# Patient Record
Sex: FEMALE | Race: WHITE | NOT HISPANIC OR LATINO | URBAN - METROPOLITAN AREA
[De-identification: names, ages, dates, MRNs, and addresses within clinical notes are randomized per-mention and may not be internally consistent; named-entity substitution may affect disease eponyms.]

---

## 2023-08-29 PROBLEM — K13.0 ANGULAR CHEILITIS: Status: ACTIVE | Noted: 2023-08-29

## 2023-08-29 PROBLEM — M41.9 SCOLIOSIS: Status: ACTIVE | Noted: 2023-08-29

## 2023-08-29 RX ORDER — ALUMINUM CHLORIDE 20 %
1 SOLUTION, NON-ORAL TOPICAL NIGHTLY
COMMUNITY
End: 2024-02-02 | Stop reason: SDUPTHER

## 2023-08-29 RX ORDER — NORETHINDRONE ACETATE AND ETHINYL ESTRADIOL .02; 1 MG/1; MG/1
1 TABLET ORAL DAILY
COMMUNITY
End: 2024-02-02 | Stop reason: WASHOUT

## 2023-08-29 RX ORDER — LEVONORGESTREL/ETHIN.ESTRADIOL 0.1-0.02MG
1 TABLET ORAL DAILY
COMMUNITY
Start: 2019-02-13 | End: 2024-02-02 | Stop reason: WASHOUT

## 2023-11-10 ENCOUNTER — CLINICAL SUPPORT (OUTPATIENT)
Dept: OPHTHALMOLOGY | Facility: CLINIC | Age: 28
End: 2023-11-10
Payer: COMMERCIAL

## 2023-11-10 ENCOUNTER — APPOINTMENT (OUTPATIENT)
Dept: OPHTHALMOLOGY | Facility: CLINIC | Age: 28
End: 2023-11-10
Payer: COMMERCIAL

## 2023-11-10 ENCOUNTER — OFFICE VISIT (OUTPATIENT)
Dept: OPHTHALMOLOGY | Facility: CLINIC | Age: 28
End: 2023-11-10
Payer: COMMERCIAL

## 2023-11-10 DIAGNOSIS — H01.02B SQUAMOUS BLEPHARITIS OF UPPER AND LOWER EYELIDS OF BOTH EYES: ICD-10-CM

## 2023-11-10 DIAGNOSIS — H52.7 REFRACTIVE ERROR: Primary | ICD-10-CM

## 2023-11-10 DIAGNOSIS — H18.823 CONTACT LENS OVERWEAR OF BOTH EYES: ICD-10-CM

## 2023-11-10 DIAGNOSIS — H01.02A SQUAMOUS BLEPHARITIS OF UPPER AND LOWER EYELIDS OF BOTH EYES: ICD-10-CM

## 2023-11-10 PROCEDURE — 92015 DETERMINE REFRACTIVE STATE: CPT | Performed by: OPHTHALMOLOGY

## 2023-11-10 PROCEDURE — FLVLF CONTACT LENS EVALUATION (SP): Performed by: OPHTHALMOLOGY

## 2023-11-10 PROCEDURE — 99214 OFFICE O/P EST MOD 30 MIN: CPT | Performed by: OPHTHALMOLOGY

## 2023-11-10 ASSESSMENT — SLIT LAMP EXAM - LIDS
COMMENTS: NORMAL
COMMENTS: NORMAL

## 2023-11-10 ASSESSMENT — REFRACTION_CURRENTRX
OS_SPHERE: +5.50
OD_SPHERE: +5.25
OS_BRAND: AIR OPTIX HYDRAGLYDE
OD_DIAMETER: 14.2
OD_SPHERE: +5.25
OD_BASECURVE: 8.6
OS_DIAMETER: 14.2
OS_DIAMETER: 14.2
OD_BASECURVE: 8.6
OS_BRAND: AIR OPTIX HYDRAGLYDE
OD_DIAMETER: 14.2
OS_BASECURVE: 8.6
OS_SPHERE: +5.75
OD_BRAND: AIR OPTIX HYDRAGLYDE
OD_BRAND: AIR OPTIX HYDRAGLYDE
OS_BASECURVE: 8.6

## 2023-11-10 ASSESSMENT — ENCOUNTER SYMPTOMS
NEUROLOGICAL NEGATIVE: 0
DEPRESSION: 0
HEMATOLOGIC/LYMPHATIC NEGATIVE: 0
OCCASIONAL FEELINGS OF UNSTEADINESS: 0
ENDOCRINE NEGATIVE: 0
LOSS OF SENSATION IN FEET: 0
CARDIOVASCULAR NEGATIVE: 0
EYES NEGATIVE: 0
MUSCULOSKELETAL NEGATIVE: 0
RESPIRATORY NEGATIVE: 0
ALLERGIC/IMMUNOLOGIC NEGATIVE: 0
PSYCHIATRIC NEGATIVE: 0
GASTROINTESTINAL NEGATIVE: 0
CONSTITUTIONAL NEGATIVE: 0

## 2023-11-10 ASSESSMENT — EXTERNAL EXAM - LEFT EYE: OS_EXAM: NORMAL

## 2023-11-10 ASSESSMENT — VISUAL ACUITY
OD_CC: 20/20
CORRECTION_TYPE: CONTACTS
METHOD: SNELLEN - SINGLE
OS_CC: 20/20

## 2023-11-10 ASSESSMENT — KERATOMETRY
OD_AXISANGLE2_DEGREES: 5
OD_K1POWER_DIOPTERS: 43.50
OS_K1POWER_DIOPTERS: 43.50
OS_K2POWER_DIOPTERS: 44.00
OS_AXISANGLE_DEGREES: 80
OD_AXISANGLE_DEGREES: 95
METHOD_AUTO_MANUAL: AUTOMATED
OD_K2POWER_DIOPTERS: 44.50
OS_AXISANGLE2_DEGREES: 170

## 2023-11-10 ASSESSMENT — TONOMETRY
OS_IOP_MMHG: 15
OD_IOP_MMHG: 15
IOP_METHOD: GOLDMANN APPLANATION

## 2023-11-10 ASSESSMENT — REFRACTION_MANIFEST
OS_AXIS: 075
METHOD_AUTOREFRACTION: 1
OS_SPHERE: +5.25
OS_CYLINDER: -0.50
OD_CYLINDER: -0.25
OD_AXIS: 115
OD_SPHERE: +5.00

## 2023-11-10 ASSESSMENT — CUP TO DISC RATIO
OS_RATIO: 0.45
OD_RATIO: 0.4

## 2023-11-10 ASSESSMENT — PATIENT HEALTH QUESTIONNAIRE - PHQ9
SUM OF ALL RESPONSES TO PHQ9 QUESTIONS 1 AND 2: 0
2. FEELING DOWN, DEPRESSED OR HOPELESS: NOT AT ALL
1. LITTLE INTEREST OR PLEASURE IN DOING THINGS: NOT AT ALL

## 2023-11-10 ASSESSMENT — PAIN SCALES - GENERAL: PAINLEVEL: 0-NO PAIN

## 2023-11-10 ASSESSMENT — EXTERNAL EXAM - RIGHT EYE: OD_EXAM: NORMAL

## 2023-11-10 NOTE — PROGRESS NOTES
Subjective   Patient ID: Renée Persaud is a 28 y.o. female.    Chief Complaint    Annual Exam       HPI    No visual acuity (VA) complaints    Complete and cl exam.  No new changes in health history or meds.  Mostly wears cl's.  No new problems or complaints.  Occasionally wears ew.    Last edited by Easton Mcnair MD on 11/10/2023  3:49 PM.        No current outpatient medications on file. (Ophthalmology pharm classes)       Current Outpatient Medications (Other)   Medication Sig Dispense Refill    aluminum chloride (Drysol Dab-O-Matic) 20 % external solution Apply 1 Application topically once daily at bedtime.      levonorgestreL-ethinyl estrad (Sronyx) 0.1-20 mg-mcg tablet Take 1 tablet by mouth once daily.      norethindrone ac-eth estradioL (Junel 1/20, 21,) 1-20 mg-mcg tablet Take 1 tablet by mouth once daily.         Objective   Base Eye Exam       Visual Acuity (Snellen - Single)         Right Left    Dist cc 20/20 20/20      Correction: Contacts              Tonometry (Goldmann Applanation, 3:26 PM)         Right Left    Pressure 15 15              Pupils         Dark Shape React APD    Right 5 Round 2 None    Left 5 Round 2 None              Extraocular Movement         Right Left     Full Full              Dilation       Both eyes: 1% Tropic 2.5% Phen @ 3:26 PM                  Additional Tests       Keratometry (Automated)         K1 Axis K2 Axis    Right 43.50 5 44.50 95    Left 43.50 170 44.00 80                  Slit Lamp and Fundus Exam       External Exam         Right Left    External Normal Normal              Slit Lamp Exam         Right Left    Lids/Lashes Normal Normal    Conjunctiva/Sclera normal bulbar and palepbral conjunctiva normal bulbar and palepbral conjunctiva    Cornea superficial corneal pannus superficial corneal pannus    Anterior Chamber normal anterior chamber, deep and quiet normal anterior chamber, deep and quiet    Iris iris normal iris normal    Lens normal clear lens  capsule/cortex/nucleus normal clear lens capsule/cortex/nucleus    Anterior Vitreous vitreous clear and normal vitreous clear and normal              Fundus Exam         Right Left    Disc Normal Normal    C/D Ratio 0.4 0.45    Macula normal macula normal macula    Vessels normal retinal vessels normal retinal vessels    Periphery normal retinal periphery normal retinal periphery                  Refraction       Manifest Refraction (Auto)         Sphere Cylinder Axis    Right +5.00 -0.25 115    Left +5.25 -0.50 075              Final Rx         Sphere Dist VA    Right +5.00 20/20    Left +5.25 20/20      Type: distance    Expiration Date: 11/10/2025    Pupillary Distance: 61                  Contact Lens Exam       Current Contact Lens Rx         Brand Base Curve Diameter Sphere    Right Air Optix HydraGlyde 8.6 14.2 +5.25    Left Air Optix HydraGlyde 8.6 14.2 +5.75              Current Contact Lens Rx #2 (Trial Lens, Dispensed)         Brand Base Curve Diameter Sphere    Right Air Optix HydraGlyde 8.6 14.2 +5.25    Left Air Optix HydraGlyde 8.6 14.2 +5.50              Keratometry (Automated)         K1 Axis K2 Axis    Right 43.50 5 44.50 95    Left 43.50 170 44.00 80              Final Contact Lens Rx         Brand Base Curve Diameter Sphere    Right Air Optix HydraGlyde 8.6 14.2 +5.25    Left Air Optix HydraGlyde 8.6 14.2 +5.50      Expiration Date: 11/10/2024    Replacement: Monthly                    Assessment/Plan   Problem List Items Addressed This Visit          Eye/Vision problems    Contact lens overwear of both eyes     Wear lenses less and lubricate more.          Squamous blepharitis of upper and lower eyelids of both eyes    Refractive error - Primary

## 2024-01-09 ENCOUNTER — APPOINTMENT (OUTPATIENT)
Dept: PRIMARY CARE | Facility: CLINIC | Age: 29
End: 2024-01-09
Payer: COMMERCIAL

## 2024-02-02 ENCOUNTER — OFFICE VISIT (OUTPATIENT)
Dept: PRIMARY CARE | Facility: CLINIC | Age: 29
End: 2024-02-02
Payer: COMMERCIAL

## 2024-02-02 VITALS
SYSTOLIC BLOOD PRESSURE: 104 MMHG | HEIGHT: 62 IN | RESPIRATION RATE: 18 BRPM | WEIGHT: 145 LBS | OXYGEN SATURATION: 99 % | DIASTOLIC BLOOD PRESSURE: 68 MMHG | HEART RATE: 72 BPM | TEMPERATURE: 96.9 F | BODY MASS INDEX: 26.68 KG/M2

## 2024-02-02 DIAGNOSIS — N91.2 AMENORRHEA: ICD-10-CM

## 2024-02-02 DIAGNOSIS — Z00.00 WELL ADULT EXAM: Primary | ICD-10-CM

## 2024-02-02 DIAGNOSIS — R61 EXCESSIVE SWEATING: ICD-10-CM

## 2024-02-02 LAB — PREGNANCY TEST URINE, POC: NEGATIVE

## 2024-02-02 PROCEDURE — 99395 PREV VISIT EST AGE 18-39: CPT | Performed by: NURSE PRACTITIONER

## 2024-02-02 PROCEDURE — 81025 URINE PREGNANCY TEST: CPT | Performed by: NURSE PRACTITIONER

## 2024-02-02 PROCEDURE — 1036F TOBACCO NON-USER: CPT | Performed by: NURSE PRACTITIONER

## 2024-02-02 RX ORDER — LEVONORGESTREL/ETHIN.ESTRADIOL 0.1-0.02MG
1 TABLET ORAL DAILY
Qty: 28 TABLET | Refills: 5 | Status: SHIPPED | OUTPATIENT
Start: 2024-02-02 | End: 2025-02-01

## 2024-02-02 RX ORDER — ALUMINUM CHLORIDE 20 %
1 SOLUTION, NON-ORAL TOPICAL NIGHTLY
Qty: 30 ML | Refills: 5 | Status: SHIPPED | OUTPATIENT
Start: 2024-02-02 | End: 2024-07-31

## 2024-02-02 ASSESSMENT — PATIENT HEALTH QUESTIONNAIRE - PHQ9
2. FEELING DOWN, DEPRESSED OR HOPELESS: NOT AT ALL
1. LITTLE INTEREST OR PLEASURE IN DOING THINGS: NOT AT ALL
SUM OF ALL RESPONSES TO PHQ9 QUESTIONS 1 AND 2: 0

## 2024-02-02 ASSESSMENT — ENCOUNTER SYMPTOMS
NEUROLOGICAL NEGATIVE: 1
GASTROINTESTINAL NEGATIVE: 1
MUSCULOSKELETAL NEGATIVE: 1
RESPIRATORY NEGATIVE: 1
EYES NEGATIVE: 1
HEMATOLOGIC/LYMPHATIC NEGATIVE: 1
CONSTITUTIONAL NEGATIVE: 1
CARDIOVASCULAR NEGATIVE: 1
PSYCHIATRIC NEGATIVE: 1
ALLERGIC/IMMUNOLOGIC NEGATIVE: 1
ENDOCRINE NEGATIVE: 1

## 2024-02-02 ASSESSMENT — LIFESTYLE VARIABLES
HOW OFTEN DO YOU HAVE A DRINK CONTAINING ALCOHOL: 2-4 TIMES A MONTH
HAVE YOU OR SOMEONE ELSE BEEN INJURED AS A RESULT OF YOUR DRINKING: NO
HAS A RELATIVE, FRIEND, DOCTOR, OR ANOTHER HEALTH PROFESSIONAL EXPRESSED CONCERN ABOUT YOUR DRINKING OR SUGGESTED YOU CUT DOWN: NO
HOW OFTEN DO YOU HAVE SIX OR MORE DRINKS ON ONE OCCASION: NEVER
AUDIT-C TOTAL SCORE: 2
HOW MANY STANDARD DRINKS CONTAINING ALCOHOL DO YOU HAVE ON A TYPICAL DAY: 1 OR 2
AUDIT TOTAL SCORE: 2
SKIP TO QUESTIONS 9-10: 1

## 2024-02-02 ASSESSMENT — PAIN SCALES - GENERAL: PAINLEVEL: 0-NO PAIN

## 2024-02-02 NOTE — PROGRESS NOTES
"History Of Present Illness  Renée Persaud is a 28 y.o. female presenting for \"OTHER (My period is 2 weeks late and I've taken multiple pregnancy tests and they have been negative. I got off birth control pills in July but would like to get back on them.).\"    HPI 28-year-old female here today to restart birth control and for a physical we will run a pregnancy test as patient is overdue for her.  At this time before we start birth control    Past Medical History  Patient Active Problem List    Diagnosis Date Noted    Excessive sweating 02/02/2024    Contact lens overwear of both eyes 11/10/2023    Squamous blepharitis of upper and lower eyelids of both eyes 11/10/2023    Refractive error 11/10/2023    Angular cheilitis 08/29/2023    Scoliosis 08/29/2023        Medications  Current Outpatient Medications   Medication Instructions    aluminum chloride (Drysol Dab-O-Matic) 20 % external solution 1 Application, Topical, Nightly    levonorgestreL-ethinyl estrad (Sronyx) 0.1-20 mg-mcg tablet 1 tablet, oral, Daily        Surgical History  She has a past surgical history that includes Colonoscopy (05/2023).     Social History  She reports that she has never smoked. She has never been exposed to tobacco smoke. She has never used smokeless tobacco. She reports current alcohol use. She reports that she does not use drugs.    Family History  Family History   Problem Relation Name Age of Onset    Glaucoma Mother      Glaucoma Maternal Grandmother      Diabetes Maternal Grandfather      Hypertension Maternal Grandfather          Allergies  Patient has no known allergies.    ROS  Review of Systems   Constitutional: Negative.    HENT: Negative.     Eyes: Negative.    Respiratory: Negative.     Cardiovascular: Negative.    Gastrointestinal: Negative.    Endocrine: Negative.    Genitourinary: Negative.    Musculoskeletal: Negative.    Skin: Negative.    Allergic/Immunologic: Negative.    Neurological: Negative.    Hematological: " Negative.    Psychiatric/Behavioral: Negative.          Last Recorded Vitals  /68 (BP Location: Right arm, Patient Position: Sitting, BP Cuff Size: Adult)   Pulse 72   Temp 36.1 °C (96.9 °F)   Resp 18   Wt 65.8 kg (145 lb)   SpO2 99%     Physical Exam  Vitals and nursing note reviewed.   Constitutional:       Appearance: Normal appearance.   HENT:      Head: Normocephalic and atraumatic.      Right Ear: Tympanic membrane, ear canal and external ear normal.      Left Ear: Tympanic membrane, ear canal and external ear normal.      Nose: Nose normal.      Mouth/Throat:      Mouth: Mucous membranes are moist.      Pharynx: Oropharynx is clear.   Eyes:      Extraocular Movements: Extraocular movements intact.      Conjunctiva/sclera: Conjunctivae normal.      Pupils: Pupils are equal, round, and reactive to light.   Neck:      Thyroid: No thyromegaly.   Cardiovascular:      Rate and Rhythm: Normal rate and regular rhythm.      Pulses: Normal pulses.      Heart sounds: Normal heart sounds, S1 normal and S2 normal.   Pulmonary:      Effort: Pulmonary effort is normal.      Breath sounds: Normal breath sounds. No wheezing or rhonchi.   Abdominal:      General: Bowel sounds are normal.      Palpations: Abdomen is soft. There is no mass.      Tenderness: There is no abdominal tenderness. There is no guarding.   Genitourinary:     Comments: Not examined  Musculoskeletal:         General: Normal range of motion.      Cervical back: Normal range of motion.      Right lower leg: No edema.      Left lower leg: No edema.   Lymphadenopathy:      Cervical: No cervical adenopathy.   Skin:     General: Skin is warm and dry.      Capillary Refill: Capillary refill takes less than 2 seconds.      Findings: No rash.   Neurological:      General: No focal deficit present.      Mental Status: She is alert and oriented to person, place, and time. Mental status is at baseline.      Cranial Nerves: Cranial nerves 2-12 are intact. No  cranial nerve deficit.      Sensory: Sensation is intact.      Motor: Motor function is intact.      Coordination: Coordination is intact.      Gait: Gait is intact.   Psychiatric:         Mood and Affect: Mood normal.         Behavior: Behavior normal.         Thought Content: Thought content normal.         Judgment: Judgment normal.       Relevant Results      Assessment/Plan   Renée was seen today for other.  Diagnoses and all orders for this visit:  Well adult exam (Primary)  Amenorrhea  -     levonorgestreL-ethinyl estrad (Sronyx) 0.1-20 mg-mcg tablet; Take 1 tablet by mouth once daily.  Excessive sweating  -     aluminum chloride (Drysol Dab-O-Matic) 20 % external solution; Apply 1 Application topically once daily at bedtime.      Counseling:       Medication education:         Education:  The patient is counseled regarding potential side-effects of all new medications        Understanding:  Patient expressed understanding        Adherence:  No barriers to adherence identified     Mela Rose, APRN-CNP

## 2024-06-28 DIAGNOSIS — N91.2 AMENORRHEA: ICD-10-CM

## 2024-06-28 RX ORDER — LEVONORGESTREL/ETHIN.ESTRADIOL 0.1-0.02MG
1 TABLET ORAL DAILY
Qty: 28 TABLET | Refills: 5 | Status: SHIPPED | OUTPATIENT
Start: 2024-06-28 | End: 2025-06-28

## 2024-06-28 NOTE — TELEPHONE ENCOUNTER
STEPHON IS THE BC ON HER MED LIST SO THAT IS WHAT HS BEEN POPULATED. PT HAD A PHYSICAL ON 2/02/24.

## 2024-07-25 NOTE — PROGRESS NOTES
HPI   28 yo presents as new pt for metabolic management.    -was able to lose nearly 20 lbs easily with lifestyle in her early 20's  -recently back up to 140lb range    -pt exercising intensely 3-4 hrs at [employer] weekly  -eats healthy, not snacking  -gets good night sleep  -not overly stressed    -at times gets fast food/take out for lunch and some dinners  -weekends can be a challenge at times    Pt wondering about thyroid, in 2023 at [facility].  There is a FH of thyroid disease.    Past Medical History       Patient Active Problem List     Diagnosis Date Noted    Excessive sweating 02/02/2024    Contact lens overwear of both eyes 11/10/2023    Squamous blepharitis of upper and lower eyelids of both eyes 11/10/2023    Refractive error 11/10/2023    Angular cheilitis 08/29/2023    Scoliosis 08/29/2023      Past Medical History       Patient Active Problem List     Diagnosis Date Noted    Excessive sweating 02/02/2024    Contact lens overwear of both eyes 11/10/2023    Squamous blepharitis of upper and lower eyelids of both eyes 11/10/2023    Refractive error 11/10/2023    Angular cheilitis 08/29/2023    Scoliosis 08/29/2023      Family History  Family History          Family History   Problem Relation Name Age of Onset    Glaucoma Mother        Glaucoma Maternal Grandmother        Diabetes Maternal Grandfather        Hypertension Maternal Grandfather             SH: -negative tobacco, social alcohol    Current Outpatient Medications:     aluminum chloride (Drysol Dab-O-Matic) 20 % external solution, Apply 1 Application topically once daily at bedtime., Disp: 30 mL, Rfl: 5    levonorgestreL-ethinyl estrad (Sronyx) 0.1-20 mg-mcg tablet, Take 1 tablet by mouth once daily., Disp: 28 tablet, Rfl: 5      Allergies as of 07/26/2024    (No Known Allergies)         Review of Systems   Cardiology: Lightheadedness-denies.  Chest pain-denies.  Leg edema-denies.  Palpitations-denies.  Respiratory: Cough-denies. Shortness of  breath-denies.  Wheezing-denies.  Gastroenterology: Constipation-denies.  Diarrhea-denies.  Heartburn-denies.  Endocrinology: Cold intolerance-denies.  Heat intolerance-denies.  Sweats-denies.  Neurology: Headache-denies.  Tremor-denies.  Neuropathy in extremities-denies.  Psychology: Low energy-denies.  Irritability-denies.  Sleep disturbances-denies.      /79 (BP Location: Left arm, Patient Position: Sitting, BP Cuff Size: Small adult)   Pulse 87   Ht 1.524 m (5')   Wt 63.5 kg (140 lb)   LMP  (LMP Unknown)   BMI 27.34 kg/m²       Labs:  Lab Results   Component Value Date    WBC 4.7 02/22/2022    NRBC 0 02/22/2022    RBC 4.88 02/22/2022    HGB 14.4 02/22/2022    HCT 43.5 02/22/2022     02/22/2022     Lab Results   Component Value Date    CALCIUM 9.0 02/22/2022    AST 21 02/22/2022    ALKPHOS 73 02/22/2022    BILITOT 0.4 02/22/2022    PROT 7.0 02/22/2022    ALBUMIN 4.1 02/22/2022    GLOB 2.9 02/22/2022    AGR 1.4 (L) 02/22/2022     02/22/2022    K 4.4 02/22/2022     02/22/2022    CO2 25 02/22/2022    ANIONGAP 11 02/22/2022    BUN 13 02/22/2022    CREATININE 0.8 02/22/2022    UREACREAUR 16.3 02/22/2022    GLUCOSE 87 02/22/2022    ALT 15 02/22/2022    EGFR 105 02/22/2022     Lab Results   Component Value Date    CHOL 136 02/22/2022    TRIG 65 02/22/2022    HDL 50 (L) 02/22/2022    LDLCALC 73 02/22/2022           Physical Exam   General Appearance: pleasant, cooperative, no acute distress  HEENT: no chemosis, no proptosis, no lid lag, no lid retraction  Neck: no lymphadenopathy, no thyromegaly, no dominant thyroid nodules  Heart: no murmurs, regular rate and rhythm, S1 and S2  Lungs: no wheezes, no rhonci, no rales  Extremities: no lower extremity swelling      Assessment/Plan     1. Weight gain  -no obvious hormone issues on history/exam    -caloric restriction, intermittent fasting, higher protein/lower carb  -would target 1,000-1500 calories/day    -pack lunch  -use a fitness/diet  tracker on smart phone  -meal prep    -screen with basic labs including tsh/tpo ab    2. Constipation, unspecified constipation type  -increase fluid intake    Follow Up:  prn    -labs/tests/notes reviewed  -reviewed and counseled patient on medication monitoring and side effects

## 2024-07-26 ENCOUNTER — APPOINTMENT (OUTPATIENT)
Dept: ENDOCRINOLOGY | Facility: CLINIC | Age: 29
End: 2024-07-26
Payer: COMMERCIAL

## 2024-07-26 VITALS
DIASTOLIC BLOOD PRESSURE: 79 MMHG | HEIGHT: 60 IN | BODY MASS INDEX: 27.48 KG/M2 | WEIGHT: 140 LBS | HEART RATE: 87 BPM | SYSTOLIC BLOOD PRESSURE: 116 MMHG

## 2024-07-26 DIAGNOSIS — R63.5 WEIGHT GAIN: Primary | ICD-10-CM

## 2024-07-26 DIAGNOSIS — K59.00 CONSTIPATION, UNSPECIFIED CONSTIPATION TYPE: ICD-10-CM

## 2024-07-26 PROCEDURE — 3008F BODY MASS INDEX DOCD: CPT | Performed by: INTERNAL MEDICINE

## 2024-07-26 PROCEDURE — 99203 OFFICE O/P NEW LOW 30 MIN: CPT | Performed by: INTERNAL MEDICINE

## 2024-07-26 PROCEDURE — 1036F TOBACCO NON-USER: CPT | Performed by: INTERNAL MEDICINE

## 2024-07-26 ASSESSMENT — PAIN SCALES - GENERAL: PAINLEVEL: 0-NO PAIN

## 2024-07-26 ASSESSMENT — ENCOUNTER SYMPTOMS: DEPRESSION: 0

## 2024-07-30 ENCOUNTER — LAB (OUTPATIENT)
Dept: LAB | Facility: LAB | Age: 29
End: 2024-07-30
Payer: COMMERCIAL

## 2024-07-30 DIAGNOSIS — R63.5 WEIGHT GAIN: ICD-10-CM

## 2024-07-30 DIAGNOSIS — K59.00 CONSTIPATION, UNSPECIFIED CONSTIPATION TYPE: ICD-10-CM

## 2024-07-30 LAB
ALBUMIN SERPL BCP-MCNC: 3.9 G/DL (ref 3.4–5)
ALP SERPL-CCNC: 48 U/L (ref 33–110)
ALT SERPL W P-5'-P-CCNC: 9 U/L (ref 7–45)
ANION GAP SERPL CALC-SCNC: 14 MMOL/L (ref 10–20)
AST SERPL W P-5'-P-CCNC: 17 U/L (ref 9–39)
BASOPHILS # BLD AUTO: 0.02 X10*3/UL (ref 0–0.1)
BASOPHILS NFR BLD AUTO: 0.4 %
BILIRUB SERPL-MCNC: 0.4 MG/DL (ref 0–1.2)
BUN SERPL-MCNC: 14 MG/DL (ref 6–23)
CALCIUM SERPL-MCNC: 9 MG/DL (ref 8.6–10.6)
CHLORIDE SERPL-SCNC: 102 MMOL/L (ref 98–107)
CO2 SERPL-SCNC: 25 MMOL/L (ref 21–32)
CREAT SERPL-MCNC: 0.74 MG/DL (ref 0.5–1.05)
EGFRCR SERPLBLD CKD-EPI 2021: >90 ML/MIN/1.73M*2
EOSINOPHIL # BLD AUTO: 0.04 X10*3/UL (ref 0–0.7)
EOSINOPHIL NFR BLD AUTO: 0.7 %
ERYTHROCYTE [DISTWIDTH] IN BLOOD BY AUTOMATED COUNT: 11.9 % (ref 11.5–14.5)
GLUCOSE SERPL-MCNC: 107 MG/DL (ref 74–99)
HCT VFR BLD AUTO: 40.4 % (ref 36–46)
HGB BLD-MCNC: 13.6 G/DL (ref 12–16)
IMM GRANULOCYTES # BLD AUTO: 0.02 X10*3/UL (ref 0–0.7)
IMM GRANULOCYTES NFR BLD AUTO: 0.4 % (ref 0–0.9)
LYMPHOCYTES # BLD AUTO: 1.78 X10*3/UL (ref 1.2–4.8)
LYMPHOCYTES NFR BLD AUTO: 33.1 %
MCH RBC QN AUTO: 29.4 PG (ref 26–34)
MCHC RBC AUTO-ENTMCNC: 33.7 G/DL (ref 32–36)
MCV RBC AUTO: 87 FL (ref 80–100)
MONOCYTES # BLD AUTO: 0.29 X10*3/UL (ref 0.1–1)
MONOCYTES NFR BLD AUTO: 5.4 %
NEUTROPHILS # BLD AUTO: 3.23 X10*3/UL (ref 1.2–7.7)
NEUTROPHILS NFR BLD AUTO: 60 %
NRBC BLD-RTO: 0 /100 WBCS (ref 0–0)
PLATELET # BLD AUTO: 213 X10*3/UL (ref 150–450)
POTASSIUM SERPL-SCNC: 4.4 MMOL/L (ref 3.5–5.3)
PROT SERPL-MCNC: 6.8 G/DL (ref 6.4–8.2)
RBC # BLD AUTO: 4.62 X10*6/UL (ref 4–5.2)
SODIUM SERPL-SCNC: 137 MMOL/L (ref 136–145)
THYROPEROXIDASE AB SERPL-ACNC: <28 IU/ML
TSH SERPL-ACNC: 1.1 MIU/L (ref 0.44–3.98)
WBC # BLD AUTO: 5.4 X10*3/UL (ref 4.4–11.3)

## 2024-07-30 PROCEDURE — 36415 COLL VENOUS BLD VENIPUNCTURE: CPT

## 2024-07-30 PROCEDURE — 80053 COMPREHEN METABOLIC PANEL: CPT

## 2024-07-30 PROCEDURE — 86376 MICROSOMAL ANTIBODY EACH: CPT

## 2024-07-30 PROCEDURE — 84443 ASSAY THYROID STIM HORMONE: CPT

## 2024-07-30 PROCEDURE — 85025 COMPLETE CBC W/AUTO DIFF WBC: CPT

## 2024-12-06 ENCOUNTER — OFFICE VISIT (OUTPATIENT)
Dept: OBSTETRICS AND GYNECOLOGY | Facility: CLINIC | Age: 29
End: 2024-12-06
Payer: COMMERCIAL

## 2024-12-06 VITALS
SYSTOLIC BLOOD PRESSURE: 118 MMHG | HEIGHT: 60 IN | BODY MASS INDEX: 26.93 KG/M2 | DIASTOLIC BLOOD PRESSURE: 74 MMHG | WEIGHT: 137.2 LBS

## 2024-12-06 DIAGNOSIS — Z01.419 ENCOUNTER FOR ANNUAL ROUTINE GYNECOLOGICAL EXAMINATION: Primary | ICD-10-CM

## 2024-12-06 DIAGNOSIS — Z30.41 SURVEILLANCE FOR BIRTH CONTROL, ORAL CONTRACEPTIVES: ICD-10-CM

## 2024-12-06 DIAGNOSIS — N76.0 RECURRENT VAGINITIS: ICD-10-CM

## 2024-12-06 PROCEDURE — 3008F BODY MASS INDEX DOCD: CPT

## 2024-12-06 PROCEDURE — 99385 PREV VISIT NEW AGE 18-39: CPT

## 2024-12-06 PROCEDURE — 1036F TOBACCO NON-USER: CPT

## 2024-12-06 RX ORDER — LEVONORGESTREL AND ETHINYL ESTRADIOL 0.15-0.03
1 KIT ORAL DAILY
Qty: 91 TABLET | Refills: 3 | Status: SHIPPED | OUTPATIENT
Start: 2024-12-06 | End: 2025-12-06

## 2024-12-06 ASSESSMENT — ENCOUNTER SYMPTOMS
SHORTNESS OF BREATH: 0
OCCASIONAL FEELINGS OF UNSTEADINESS: 0
VOMITING: 0
COUGH: 0
LOSS OF SENSATION IN FEET: 0
HEADACHES: 0
ABDOMINAL PAIN: 0
DIZZINESS: 0
DEPRESSION: 0
UNEXPECTED WEIGHT CHANGE: 0
FEVER: 0
COLOR CHANGE: 0
DYSURIA: 0
FATIGUE: 0
NAUSEA: 0
CHILLS: 0

## 2024-12-06 ASSESSMENT — PAIN SCALES - GENERAL: PAINLEVEL_OUTOF10: 0-NO PAIN

## 2024-12-06 ASSESSMENT — LIFESTYLE VARIABLES
HOW OFTEN DO YOU HAVE A DRINK CONTAINING ALCOHOL: 2-4 TIMES A MONTH
AUDIT-C TOTAL SCORE: 2
HOW MANY STANDARD DRINKS CONTAINING ALCOHOL DO YOU HAVE ON A TYPICAL DAY: 1 OR 2
HOW OFTEN DO YOU HAVE SIX OR MORE DRINKS ON ONE OCCASION: NEVER
SKIP TO QUESTIONS 9-10: 1

## 2024-12-06 ASSESSMENT — PATIENT HEALTH QUESTIONNAIRE - PHQ9
SUM OF ALL RESPONSES TO PHQ9 QUESTIONS 1 & 2: 0
1. LITTLE INTEREST OR PLEASURE IN DOING THINGS: NOT AT ALL
2. FEELING DOWN, DEPRESSED OR HOPELESS: NOT AT ALL

## 2024-12-06 NOTE — PROGRESS NOTES
Subjective   Renée Persaud is a 29 y.o. female who is here for a routine GYN exam as a new patient establishing care. Previously GYN care with her PCP.   -On OCP since 10th grade; changed OCP from Junel to currently Sronyx as she was experiencing some vaginal dryness with intercourse; does not feel this has significantly changed; feels her bleeds are now just heavier; considering 91 day OCP.    -Has experienced 3 episodes of BV this year which is new for her; did experience sx each time after her menses finished;  with same partner, no new partners; uses honey pot wash; does feel she sweats in groin region more frequently, especially with working out or noticeable at work; denies any current symptoms at this time; started taking Culturelle probiotic and no recent episodes of symptoms since; does have boric acid at home which she has used previously without irritation; does admit to partner ejaculation during intercourse.    Complaints:   frequent BV this year  Periods: regular   Dysmenorrhea:  none    Current contraception: OCP  History of abnormal Pap smear: no  History of abnormal mammogram: no      OB History          0    Para   0    Term   0       0    AB   0    Living   0         SAB   0    IAB   0    Ectopic   0    Multiple   0    Live Births   0                  Review of Systems   Constitutional:  Negative for chills, fatigue, fever and unexpected weight change.   Respiratory:  Negative for cough and shortness of breath.    Gastrointestinal:  Negative for abdominal pain, nausea and vomiting.   Genitourinary:  Negative for dyspareunia, dysuria, pelvic pain and vaginal discharge.   Skin:  Negative for color change and rash.   Neurological:  Negative for dizziness and headaches.       Objective   /74   Ht 1.524 m (5')   Wt 62.2 kg (137 lb 3.2 oz)   LMP 2024 (Exact Date)   BMI 26.80 kg/m²        General:   Alert and oriented, in no acute distress   Neck: Supple. No  visible thyromegaly.    Breast/Axilla: Normal to palpation bilaterally without masses, skin changes, or nipple discharge.    Abdomen: Soft, non-tender, without masses or organomegaly   Vulva: Normal architecture without erythema, masses, or lesions.    Vagina: Normal mucosa without lesions, masses, or atrophy. No abnormal vaginal discharge.    Cervix: Normal without masses, lesions, or signs of cervicitis   Uterus: Normal, mobile, non-enlarged uterus   Adnexa: Normal without masses or lesions   Pelvic Floor Normal    Psych Normal affect. Normal mood.      Assessment/Plan   Diagnoses and all orders for this visit:  Encounter for annual routine gynecological examination   - UTD on pap smear, next due 1/2026.  Surveillance for birth control, oral contraceptives  -     levonorgestreL-ethinyl estrad (Seasonale) 0.15 mg-30 mcg (91) tablet; Take 1 tablet by mouth once daily.  - Pt desires to switch to 91 day OCP, also for potential prevention of BV as her episodes seem to occur with menstrual bleeding.  Recurrent vaginitis   - Recommended continued use of Culturelle probiotics.   - Recommended Rephresh otc gel with known triggers.    - We discussed common known triggers that can alter normal vaginal pH balance.   - TCO if sx return; can consider OneSwab.    Rolanda Ram PA-C

## 2024-12-24 DIAGNOSIS — N91.2 AMENORRHEA: ICD-10-CM

## 2024-12-24 RX ORDER — TIMOLOL MALEATE 5 MG/ML
1 SOLUTION/ DROPS OPHTHALMIC DAILY
Qty: 84 TABLET | Refills: 1 | OUTPATIENT
Start: 2024-12-24

## 2024-12-24 NOTE — TELEPHONE ENCOUNTER
PHARMACY REQUESTING REFILL ON POPULATED MED. IT LOOKS LIKE PT OB SENT BIRTH CONTROL ON 12/06. PLEASE REVIEW.

## 2025-01-12 ENCOUNTER — OFFICE VISIT (OUTPATIENT)
Dept: URGENT CARE | Age: 30
End: 2025-01-12
Payer: COMMERCIAL

## 2025-01-12 VITALS
HEART RATE: 93 BPM | DIASTOLIC BLOOD PRESSURE: 77 MMHG | OXYGEN SATURATION: 100 % | TEMPERATURE: 98.1 F | RESPIRATION RATE: 16 BRPM | SYSTOLIC BLOOD PRESSURE: 129 MMHG

## 2025-01-12 DIAGNOSIS — J02.9 ACUTE PHARYNGITIS, UNSPECIFIED ETIOLOGY: Primary | ICD-10-CM

## 2025-01-12 LAB
POC RAPID INFLUENZA A: NEGATIVE
POC RAPID INFLUENZA B: NEGATIVE
POC RAPID MONO: NEGATIVE
POC RAPID STREP: NEGATIVE
POC SARS-COV-2 AG BINAX: NORMAL

## 2025-01-12 PROCEDURE — 86308 HETEROPHILE ANTIBODY SCREEN: CPT | Performed by: NURSE PRACTITIONER

## 2025-01-12 PROCEDURE — 87651 STREP A DNA AMP PROBE: CPT

## 2025-01-12 PROCEDURE — 87880 STREP A ASSAY W/OPTIC: CPT | Performed by: NURSE PRACTITIONER

## 2025-01-12 PROCEDURE — 99214 OFFICE O/P EST MOD 30 MIN: CPT | Performed by: NURSE PRACTITIONER

## 2025-01-12 PROCEDURE — 87811 SARS-COV-2 COVID19 W/OPTIC: CPT | Performed by: NURSE PRACTITIONER

## 2025-01-12 PROCEDURE — 87804 INFLUENZA ASSAY W/OPTIC: CPT | Performed by: NURSE PRACTITIONER

## 2025-01-12 RX ORDER — PREDNISONE 20 MG/1
40 TABLET ORAL DAILY
Qty: 10 TABLET | Refills: 0 | Status: SHIPPED | OUTPATIENT
Start: 2025-01-12 | End: 2025-01-17

## 2025-01-12 RX ORDER — AZITHROMYCIN 250 MG/1
TABLET, FILM COATED ORAL
Qty: 6 TABLET | Refills: 0 | Status: SHIPPED | OUTPATIENT
Start: 2025-01-12 | End: 2025-01-17

## 2025-01-12 NOTE — PROGRESS NOTES
Subjective   Patient ID: Renée Persaud is a 29 y.o. female. They present today with a chief complaint of Sore Throat (3 days x L tonsil painful,enlarged, covered in white stuff /Today x headache ).    History of Present Illness  Patient complaints of a sore throat for approximately 3 days. States she has swelling to her left tonsil  with exudate.  Complains of a headache today. State she's been sleeping all day. States she has a history of tonsil issues and was supposed to have her tonsils removed but then COVID happened and they did not get removed. She is requesting strep and mono testing today.     Past Medical History  Allergies as of 01/12/2025    (No Known Allergies)       (Not in a hospital admission)       Past Medical History:   Diagnosis Date    Acute pharyngitis, unspecified 10/09/2015    Sore throat    Acute tonsillitis, unspecified 10/09/2015    Exudative tonsillitis    Corneal disorder due to contact lens, bilateral     Encounter for immunization     Encounter for immunization    Localized enlarged lymph nodes 10/09/2015    Reactive cervical lymphadenopathy    Personal history of other diseases of the nervous system and sense organs     History of strabismus    Personal history of other diseases of the respiratory system 11/28/2017    History of sore throat    Personal history of other infectious and parasitic diseases 01/06/2018    History of herpes labialis    Personal history of other specified conditions 05/02/2017    History of fever    Strain of unspecified muscle, fascia and tendon at wrist and hand level, left hand, initial encounter 04/28/2014    Strain of hand, left    Unspecified disorder of refraction        Past Surgical History:   Procedure Laterality Date    COLONOSCOPY  05/2023    was found to have a polyp- non cancerous        reports that she has never smoked. She has never been exposed to tobacco smoke. She has never used smokeless tobacco. She reports current alcohol use. She  reports that she does not use drugs.    Review of Systems  Review of Systems     See HPI                          Objective    Vitals:    01/12/25 1510   BP: 129/77   Pulse: 93   Resp: 16   Temp: 36.7 °C (98.1 °F)   SpO2: 100%     No LMP recorded.    Physical Exam  CONSTITUTIONAL: The general appearance and condition of the patient were examined.  Level of distress, nutrition, external development abnormality, and general behavior were noted.  No abnormal findings.  Vital signs as documented.      ENT: External ears: left ear normal ; right ear normal. Bilateral ears have bulging TM's.  Normal external ear exam.  Bilateral swelling and redness to nasal turbinate's.  Erythema with pebbling to throat.  Enlarged tonsils.  Left tonsils has exudate.  Erythema to bilateral tonsils.         Lymph: Bilateral cervical lymph edema     CARDIOVASCULAR: The patient's heart examined for regular rate and rhythm and presence of murmurs. Note taken of any tachycardia, bradycardia or any irregular rhythm.  No abnormal findings.        RESPIRATORY/LUNGS: Chest examined for equal movement, bilaterally.  Lungs examined for equality of breath sounds. Presence or absence of rales noted bilaterally.  Examined for the presence of diffuse or scattered wheezes.  No abnormal findings.      Procedures    Point of Care Test & Imaging Results from this visit  Results for orders placed or performed in visit on 01/12/25   POCT Covid-19 Rapid Antigen   Result Value Ref Range    POC RADHA-COV-2 AG  Presumptive negative test for SARS-CoV-2 (no antigen detected)     Presumptive negative test for SARS-CoV-2 (no antigen detected)   POCT Influenza A/B manually resulted   Result Value Ref Range    POC Rapid Influenza A Negative Negative    POC Rapid Influenza B Negative Negative   POCT rapid strep A manually resulted   Result Value Ref Range    POC Rapid Strep Negative Negative   POCT Infectious mononucleosis antibody manually resulted   Result Value Ref  Range    POC Rapid Mono Negative Negative      No results found.    Diagnostic study results (if any) were reviewed by LISA Bliss.    Assessment/Plan   Allergies, medications, history, and pertinent labs/EKGs/Imaging reviewed by LISA Bliss.     Medical Decision Making  At time of discharge patient was clinically well-appearing and HDS for outpatient management. The patient and/or family was educated regarding diagnosis, supportive care, OTC and Rx medications. The patient and/or family was given the opportunity to ask questions prior to discharge.  They verbalized understanding of my discussion of the plans for treatment, expected course, indications to return to  or seek further evaluation in ED, and the need for timely follow up as directed.   They were provided with a work/school excuse if requested.    ----Patient states Amoxicillin usually does not help.  Will try Azithromycin.  Patient agreeable.     Orders and Diagnoses  Diagnoses and all orders for this visit:  Acute pharyngitis, unspecified etiology  -     POCT Covid-19 Rapid Antigen  -     POCT Influenza A/B manually resulted  -     POCT rapid strep A manually resulted  -     POCT Infectious mononucleosis antibody manually resulted  -     Group A Streptococcus, PCR  -     azithromycin (Zithromax) 250 mg tablet; Take 2 tablets (500 mg) by mouth once daily for 1 day, THEN 1 tablet (250 mg) once daily for 4 days. Take 2 tabs (500 mg) by mouth today, then take 1 tab daily for 4 days..  -     predniSONE (Deltasone) 20 mg tablet; Take 2 tablets (40 mg) by mouth once daily for 5 days.      Medical Admin Record      Patient disposition: Home    Electronically signed by LISA Bliss  3:32 PM

## 2025-01-13 LAB — S PYO DNA THROAT QL NAA+PROBE: NOT DETECTED

## 2025-01-29 ENCOUNTER — OFFICE VISIT (OUTPATIENT)
Dept: OTOLARYNGOLOGY | Facility: CLINIC | Age: 30
End: 2025-01-29
Payer: COMMERCIAL

## 2025-01-29 VITALS — BODY MASS INDEX: 25.49 KG/M2 | HEIGHT: 61 IN | WEIGHT: 135 LBS

## 2025-01-29 DIAGNOSIS — J35.1 TONSILLAR HYPERTROPHY: ICD-10-CM

## 2025-01-29 DIAGNOSIS — J03.91 RECURRENT TONSILLITIS: Primary | ICD-10-CM

## 2025-01-29 DIAGNOSIS — B99.9 INFECTION: ICD-10-CM

## 2025-01-29 PROCEDURE — 1036F TOBACCO NON-USER: CPT | Performed by: OTOLARYNGOLOGY

## 2025-01-29 PROCEDURE — 99204 OFFICE O/P NEW MOD 45 MIN: CPT | Performed by: OTOLARYNGOLOGY

## 2025-01-29 PROCEDURE — 3008F BODY MASS INDEX DOCD: CPT | Performed by: OTOLARYNGOLOGY

## 2025-01-29 RX ORDER — CLINDAMYCIN HYDROCHLORIDE 150 MG/1
CAPSULE ORAL
Qty: 21 CAPSULE | Refills: 0 | Status: SHIPPED | OUTPATIENT
Start: 2025-01-29

## 2025-01-29 NOTE — PROGRESS NOTES
"HPI  Renée Persaud is a 29 y.o. female history of recurrent tonsillitis.  We have discussed tonsillectomy in 2020 and then that got postponed from the pandemic and she did not reschedule.  She has had continued recurrent infections since including at least 4 in the last 6 months.  She presents today with sore throat and white exudate.  She has been cultured for strep and tested for mono, flu, COVID and these have all been negative.  She tends to respond well to antibiotics and at times prednisone.  She is otherwise healthy.  No bleeding disorders.  No anesthesia disorders.      Past Medical History:   Diagnosis Date    Acute pharyngitis, unspecified 10/09/2015    Sore throat    Acute tonsillitis, unspecified 10/09/2015    Exudative tonsillitis    Corneal disorder due to contact lens, bilateral     Encounter for immunization     Encounter for immunization    Localized enlarged lymph nodes 10/09/2015    Reactive cervical lymphadenopathy    Personal history of other diseases of the nervous system and sense organs     History of strabismus    Personal history of other diseases of the respiratory system 11/28/2017    History of sore throat    Personal history of other infectious and parasitic diseases 01/06/2018    History of herpes labialis    Personal history of other specified conditions 05/02/2017    History of fever    Strain of unspecified muscle, fascia and tendon at wrist and hand level, left hand, initial encounter 04/28/2014    Strain of hand, left    Unspecified disorder of refraction             Medications:     Current Outpatient Medications:     levonorgestreL-ethinyl estrad (Seasonale) 0.15 mg-30 mcg (91) tablet, Take 1 tablet by mouth once daily., Disp: 91 tablet, Rfl: 3     Allergies:  No Known Allergies     Physical Exam:  Last Recorded Vitals  Height 1.549 m (5' 1\"), weight 61.2 kg (135 lb).  General:     General appearance: Well-developed, well-nourished in no acute distress.       Voice:  " normal       Head/face: Normal appearance; nontender to palpation     Facial nerve function: Normal and symmetric bilaterally.    Oral/oropharynx:     Oral vestibule: Normal labial and gingival mucosa     Tongue/floor of mouth: Normal without lesion     Oropharynx: Clear.  No lesions present of the hard/soft palate, posterior pharynx.  2-3+ tonsils, erythematous, lacy exudate bilaterally    Neck:     Neck: Normal appearance, trachea midline     Salivary glands: Normal to palpation bilaterally     Lymph nodes: No cervical lymphadenopathy to palpation     Thyroid: No thyromegaly.  No palpable nodules     Range of motion: Normal    Neurological:     Cortical functions: Alert and oriented x3, appropriate affect       Larynx/hypopharynx:     Laryngeal findings: Mirror exam inadequate or limited secondary to enlarged base of tongue and/or excessive gagging    Ear:     Ear canal: Normal bilaterally     Tympanic membrane: Intact and mobile bilaterally     Pinna: Normal bilaterally     Hearing:  Gross hearing assessment normal by voice    Nose:     Visualized using: Anterior rhinoscopy     Nasopharynx: Inadequate mirror exam secondary to gag, anatomy.       Nasal dorsum: Nontraumatic midline appearance     Septum: Midline     Inferior turbinates: Normally sized     Mucosa: Bilateral, pink, normal appearing       ASSESSMENT/PLAN:  Recurrent tonsillitis, tonsil hypertrophy.  I have prescribed clindamycin today for the possibility of bacterial component but her symptoms and history suggest that she would benefit from adenotonsillectomy and this was again discussed.  The risks, goals, and alternatives were discussed in detail including, but not limited to, general anesthesia, dehydration, bleeding, and infection.  Velopharyngeal insufficiency and regrowth of tissue potentially requiring revision procedure in the future were also reviewed.  Informed consent was indicated and the procedure will be scheduled.        Soham Chandra,  MD

## 2025-02-03 ENCOUNTER — TELEPHONE (OUTPATIENT)
Dept: OTOLARYNGOLOGY | Facility: CLINIC | Age: 30
End: 2025-02-03
Payer: COMMERCIAL

## 2025-02-03 DIAGNOSIS — B99.9 INFECTION: Primary | ICD-10-CM

## 2025-02-03 RX ORDER — PREDNISONE 5 MG/1
TABLET ORAL
Qty: 21 TABLET | Refills: 0 | Status: SHIPPED | OUTPATIENT
Start: 2025-02-03

## 2025-02-03 NOTE — TELEPHONE ENCOUNTER
LOV 1/29/2025 for recurrent tonsillitis and tonsil hypertrophy. Clindamycin was rx'd and T&A was discussed (had been postponed due to pandemic). Called to report she has had no improvement, actually feels worse. States her pain is worse L>R and more difficult to swallow, tonsils are even larger. Please advise.

## 2025-02-07 ENCOUNTER — APPOINTMENT (OUTPATIENT)
Dept: OPHTHALMOLOGY | Facility: CLINIC | Age: 30
End: 2025-02-07
Payer: COMMERCIAL

## 2025-02-07 DIAGNOSIS — H01.02A SQUAMOUS BLEPHARITIS OF UPPER AND LOWER EYELIDS OF BOTH EYES: ICD-10-CM

## 2025-02-07 DIAGNOSIS — H52.7 REFRACTIVE ERROR: ICD-10-CM

## 2025-02-07 DIAGNOSIS — Z97.3 WEARS CONTACT LENSES: Primary | ICD-10-CM

## 2025-02-07 DIAGNOSIS — H01.02B SQUAMOUS BLEPHARITIS OF UPPER AND LOWER EYELIDS OF BOTH EYES: ICD-10-CM

## 2025-02-07 PROCEDURE — 92015 DETERMINE REFRACTIVE STATE: CPT | Performed by: OPHTHALMOLOGY

## 2025-02-07 PROCEDURE — 99214 OFFICE O/P EST MOD 30 MIN: CPT | Performed by: OPHTHALMOLOGY

## 2025-02-07 RX ORDER — NORETHINDRONE ACETATE AND ETHINYL ESTRADIOL .02; 1 MG/1; MG/1
1 TABLET ORAL DAILY
COMMUNITY

## 2025-02-07 ASSESSMENT — ENCOUNTER SYMPTOMS
RESPIRATORY NEGATIVE: 0
ALLERGIC/IMMUNOLOGIC NEGATIVE: 0
NEUROLOGICAL NEGATIVE: 0
PSYCHIATRIC NEGATIVE: 0
CONSTITUTIONAL NEGATIVE: 0
ENDOCRINE NEGATIVE: 0
EYES NEGATIVE: 0
HEMATOLOGIC/LYMPHATIC NEGATIVE: 0
GASTROINTESTINAL NEGATIVE: 0
MUSCULOSKELETAL NEGATIVE: 0
CARDIOVASCULAR NEGATIVE: 0

## 2025-02-07 ASSESSMENT — VISUAL ACUITY
OD_CC: 20/20
CORRECTION_TYPE: GLASSES
OS_CC: 20/20
CORRECTION_TYPE: CONTACTS
METHOD: SNELLEN - LINEAR
OD_CC: 20/20
METHOD: SNELLEN - LINEAR
OS_CC: 20/20

## 2025-02-07 ASSESSMENT — TONOMETRY
OS_IOP_MMHG: 15
IOP_METHOD: GOLDMANN APPLANATION
OD_IOP_MMHG: 15

## 2025-02-07 ASSESSMENT — REFRACTION_MANIFEST
OS_SPHERE: +5.50
METHOD_AUTOREFRACTION: 1
OD_SPHERE: +3.25
OS_AXIS: 116
OS_CYLINDER: +0.25

## 2025-02-07 ASSESSMENT — PAIN SCALES - GENERAL: PAINLEVEL_OUTOF10: 0-NO PAIN

## 2025-02-07 ASSESSMENT — KERATOMETRY
OS_AXISANGLE2_DEGREES: 4
OD_AXISANGLE_DEGREES: 90
OD_AXISANGLE2_DEGREES: 180
OD_K2POWER_DIOPTERS: 44.50
OD_K1POWER_DIOPTERS: 43.75
OS_K2POWER_DIOPTERS: 44.00
OS_AXISANGLE_DEGREES: 94
OS_K1POWER_DIOPTERS: 43.25

## 2025-02-07 ASSESSMENT — PATIENT HEALTH QUESTIONNAIRE - PHQ9
SUM OF ALL RESPONSES TO PHQ9 QUESTIONS 1 AND 2: 0
1. LITTLE INTEREST OR PLEASURE IN DOING THINGS: NOT AT ALL
2. FEELING DOWN, DEPRESSED OR HOPELESS: NOT AT ALL

## 2025-02-07 ASSESSMENT — SLIT LAMP EXAM - LIDS
COMMENTS: NORMAL
COMMENTS: NORMAL

## 2025-02-07 ASSESSMENT — REFRACTION_WEARINGRX
SPECS_TYPE: SVL
OD_SPHERE: +5.00
OS_SPHERE: +5.25

## 2025-02-07 ASSESSMENT — CUP TO DISC RATIO
OD_RATIO: 0.4
OS_RATIO: 0.45

## 2025-02-07 ASSESSMENT — EXTERNAL EXAM - RIGHT EYE: OD_EXAM: NORMAL

## 2025-02-07 ASSESSMENT — EXTERNAL EXAM - LEFT EYE: OS_EXAM: NORMAL

## 2025-02-07 NOTE — PROGRESS NOTES
Subjective   Patient ID: Renée Persaud is a 29 y.o. female.    Chief Complaint    Annual Exam       HPI    Complete and cl exam.  No recent changes in health history or meds.  Vision is good and stable.  No new problems or complaints.   Last edited by Easton Mcnair MD on 2/7/2025  3:36 PM.        No current outpatient medications on file. (Ophthalmology pharm classes)       Current Outpatient Medications (Other)   Medication Sig Dispense Refill    clindamycin (Cleocin) 150 mg capsule Take 1 capsule TID x 7 days 21 capsule 0    levonorgestreL-ethinyl estrad (Seasonale) 0.15 mg-30 mcg (91) tablet Take 1 tablet by mouth once daily. 91 tablet 3    norethindrone ac-eth estradioL (Junel 1/20, 21,) 1-20 mg-mcg tablet Take 1 tablet by mouth once daily.      predniSONE (Deltasone) 5 mg tablet Take 6 tabs PO daily x 1 day, then 5 tabs PO daily x 1 day, then 4 tabs PO daily x 1 day, then 3 tabs PO daily x 1 day, then 2 tabs PO daily x 1 day, then 1 tab PO daily x 1 day 21 tablet 0       Objective   Base Eye Exam       Visual Acuity (Snellen - Linear)         Right Left    Dist cc 20/20 20/20      Correction: Contacts              Visual Acuity #2 (Snellen - Linear)         Right Left    Dist cc 20/20 20/20      Correction: Glasses              Tonometry (Goldmann Applanation, 3:18 PM)         Right Left    Pressure 15 15              Pupils         Dark Shape React APD    Right 4 Round 2 None    Left 4 Round 2 None              Extraocular Movement         Right Left     Full Full              Dilation       Both eyes: 1% Tropic 2.5% Phen @ 3:18 PM                  Additional Tests       Keratometry         K1 Axis K2 Axis    Right 43.75 180 44.50 90    Left 43.25 4 44.00 94                  Slit Lamp and Fundus Exam       External Exam         Right Left    External Normal Normal              Slit Lamp Exam         Right Left    Lids/Lashes Normal Normal    Conjunctiva/Sclera White and quiet White and quiet    Cornea  Clear Clear    Anterior Chamber Deep and quiet Deep and quiet    Iris Round and reactive Round and reactive    Lens Clear Clear    Anterior Vitreous Normal Normal              Fundus Exam         Right Left    Disc Normal Normal    C/D Ratio 0.4 0.45    Macula normal macula normal macula    Vessels normal retinal vessels normal retinal vessels    Periphery normal retinal periphery normal retinal periphery                  Refraction       Wearing Rx         Sphere    Right +5.00    Left +5.25      Age: 2yrs    Type: SVL              Manifest Refraction (Auto)         Sphere Cylinder Axis    Right +3.25      Left +5.50 +0.25 116      Pupillary Distance: 61.5              Final Rx         Sphere Dist VA    Right +4.75 20/20    Left +5.00 20/20      Expiration Date: 2/7/2027                  Contact Lens Exam       Keratometry         K1 Axis K2 Axis    Right 43.75 180 44.50 90    Left 43.25 4 44.00 94              Final Contact Lens Rx         Brand Base Curve Diameter Sphere    Right Air Optix HydraGlyde 8.6 14.2 +5.25    Left Air Optix HydraGlyde 8.6 14.2 +5.50      Expiration Date: 2/7/2026    Replacement: Monthly                    Assessment/Plan   Problem List Items Addressed This Visit          Eye/Vision problems    Wears contact lenses - Primary     Spec and cl rx's given.  F/u one year full with cl exam.           Squamous blepharitis of upper and lower eyelids of both eyes    Refractive error        No Yes

## 2025-02-12 ENCOUNTER — APPOINTMENT (OUTPATIENT)
Dept: OTOLARYNGOLOGY | Facility: CLINIC | Age: 30
End: 2025-02-12
Payer: COMMERCIAL

## 2025-04-03 ENCOUNTER — PATIENT MESSAGE (OUTPATIENT)
Dept: OTOLARYNGOLOGY | Facility: CLINIC | Age: 30
End: 2025-04-03
Payer: COMMERCIAL

## 2025-04-03 DIAGNOSIS — B99.9 INFECTION: Primary | ICD-10-CM

## 2025-04-04 RX ORDER — PREDNISONE 5 MG/1
TABLET ORAL
Qty: 21 TABLET | Refills: 0 | Status: SHIPPED | OUTPATIENT
Start: 2025-04-04

## 2025-04-04 RX ORDER — AMOXICILLIN 875 MG/1
TABLET, FILM COATED ORAL
Qty: 20 TABLET | Refills: 0 | Status: SHIPPED | OUTPATIENT
Start: 2025-04-04

## 2025-04-18 ENCOUNTER — OFFICE VISIT (OUTPATIENT)
Dept: URGENT CARE | Age: 30
End: 2025-04-18
Payer: COMMERCIAL

## 2025-04-18 VITALS
SYSTOLIC BLOOD PRESSURE: 116 MMHG | RESPIRATION RATE: 20 BRPM | OXYGEN SATURATION: 99 % | DIASTOLIC BLOOD PRESSURE: 83 MMHG | TEMPERATURE: 97.5 F | HEART RATE: 71 BPM

## 2025-04-18 DIAGNOSIS — A09 TRAVELER'S DIARRHEA: Primary | ICD-10-CM

## 2025-04-18 RX ORDER — CIPROFLOXACIN 250 MG/1
750 TABLET, FILM COATED ORAL DAILY
Qty: 3 TABLET | Refills: 0 | Status: SHIPPED | OUTPATIENT
Start: 2025-04-18 | End: 2025-04-19

## 2025-04-18 ASSESSMENT — ENCOUNTER SYMPTOMS: DIARRHEA: 1

## 2025-04-18 NOTE — PROGRESS NOTES
Subjective   Patient ID: Renée Persaud is a 29 y.o. female. They present today with a chief complaint of Diarrhea (Diarrhea since she was in [place].  4/11/25. Tried pepto and immodium/3-6 times a day. ).    History of Present Illness  Patient is a pleasant 29-year-old white female, no significant past medical history, presented to clinic for to complaint of diarrhea.  Patient is reporting 1 week history of persistent watery diarrhea approximately 4-5 episodes per day.  States she returned from [place] 1 week ago and started on the last day.  It is noted that she was recently on a course of amoxicillin for bronchitis.  She denies any associated abdominal pain at this time however does report some abdominal cramping at onset of the diarrhea.  She denies any blood in the diarrhea.  No melena or hematochezia.  No nausea or vomiting.  No further complaints.  States she has been trying to drink plenty of clear fluids.  She has tried Imodium and Pepto-Bismol at home with minimal relief.  She does note overall it seems to be improving.        Diarrhea      Past Medical History  Allergies as of 04/18/2025    (No Known Allergies)       Prescriptions Prior to Admission[1]       Medical History[2]    Surgical History[3]     reports that she has never smoked. She has never been exposed to tobacco smoke. She has never used smokeless tobacco. She reports current alcohol use. She reports that she does not use drugs.    Review of Systems  Review of Systems   Gastrointestinal:  Positive for diarrhea.                                  Objective    Vitals:    04/18/25 1249   BP: 116/83   Pulse: 71   Resp: 20   Temp: 36.4 °C (97.5 °F)   SpO2: 99%     Patient's last menstrual period was 03/27/2025.    Physical Exam  General: Vitals Noted. No distress. Normocephalic.     HEENT: TMs normal, EOMI, normal conjunctiva, patent nares, Normal OP    Neck: Supple with no adenopathy.     Cardiac: Regular Rate and Rhythm. No murmur.     Pulmonary:  Equal breath sounds bilaterally. No wheezes, rhonchi, or rales.    Abdomen: Soft, non-tender, with normal bowel sounds.     Musculoskeletal: Moves all extremities, no effusion, no edema.     Skin: No obvious rashes.  Procedures    Point of Care Test & Imaging Results from this visit    Imaging  No results found.    Cardiology, Vascular, and Other Imaging  No other imaging results found for the past 2 days      Diagnostic study results (if any) were reviewed by Gerald Muñoz PA-C.    Assessment/Plan   Allergies, medications, history, and pertinent labs/EKGs/Imaging reviewed by Gerald Muñoz PA-C.     Medical Decision Making  : Patient was seen eval in the clinic with complaint of diarrhea.  On exam patient is nontoxic well-appearing respect comfortably no acute distress.  Vital signs are stable, afebrile.  Chest is clear, heart is regular, belly is diffusely soft and nontender.  She appears adequately hydrated with moist mucous membranes and normal skin turgor.   Diarrhea could likely be secondary to antibiotic use versus traveler's diarrhea.  Advise she initiate use of a probiotic.  Advise she continue drink plenty of clear fluids.  Will provide 1 dose of ciprofloxacin to 50 mg to treat traveler's diarrhea.  Advise she follow with her primary care physician in the next week.  Will be discharged home at this time.  Reviewed my impression, plan, strict return was report to ED precautions with the patient.  She expresses understanding and agreement plan of care.    Orders and Diagnoses  Diagnoses and all orders for this visit:  Traveler's diarrhea  -     ciprofloxacin (Cipro) 250 mg tablet; Take 3 tablets (750 mg) by mouth once daily for 1 day.        Medical Admin Record      Follow Up Instructions  No follow-ups on file.    Patient disposition: Home    Electronically signed by Gerald Muñoz PA-C  1:09 PM         [1] (Not in a hospital admission)  [2]   Past Medical History:  Diagnosis Date     Acute pharyngitis, unspecified 10/09/2015    Sore throat    Acute tonsillitis, unspecified 10/09/2015    Exudative tonsillitis    Corneal disorder due to contact lens, bilateral     Encounter for immunization     Encounter for immunization    Localized enlarged lymph nodes 10/09/2015    Reactive cervical lymphadenopathy    Personal history of other diseases of the nervous system and sense organs     History of strabismus    Personal history of other diseases of the respiratory system 11/28/2017    History of sore throat    Personal history of other infectious and parasitic diseases 01/06/2018    History of herpes labialis    Personal history of other specified conditions 05/02/2017    History of fever    Strain of unspecified muscle, fascia and tendon at wrist and hand level, left hand, initial encounter 04/28/2014    Strain of hand, left    Unspecified disorder of refraction    [3]   Past Surgical History:  Procedure Laterality Date    COLONOSCOPY  05/2023    was found to have a polyp- non cancerous

## 2025-05-22 ENCOUNTER — DOCUMENTATION (OUTPATIENT)
Dept: OTOLARYNGOLOGY | Facility: HOSPITAL | Age: 30
End: 2025-05-22

## 2025-05-22 NOTE — PROGRESS NOTES
Pre-op. Diagnosis:      1.  Recurrent adenotonsillitis     Post-op. Diagnosis:      1.Same as pre-op diagnosis      Operation:      1.Tonsillectomy  2.Adenoidectomy      Anesthesia:   GETA      Indications:   This is a 29-year-old female with history of recurrent episodes of adenotonsillitis for several years.  We had previously discussed adenotonsillectomy in 2020 and it was postponed because of the pandemic.  She has continued to have recurrent infections.  Symptoms have persisted despite medical treatment.  The above procedure was recommended. A detailed discussion was had regarding the risks, goals, and alternatives of the procedure. Informed consent was obtained and the patient presents today for this procedure.       Details of Procedure:   The patient was seen and identified in the preoperative area, transported to the operating room, and positioned on the table in a supine fashion. General anesthesia was induced and the patient was orotracheally intubated without difficulty. The table was turned ninety degrees and the patient was draped in the standard fashion for adenotonsillectomy. A Chris-Martin mouth gag was placed and suspended from the Reeves stand. Red rubber catheters were used to retract the soft palate bilaterally. The uvula was normal to inspection, and the palate was normal to inspection and palpation. Mirror examination of the nasopharynx revealed significant adenoid hypertrophy which was sharply removed with a curette and sent for routine histopathology. A tonsil sponge was placed in the nasopharynx to tamponade bleeding.  The right tonsil was dissected with electrocautery in an extracapsular fashion.  This was marked.  The left tonsil was then dissected in an extracapsular fashion as well and removed.  The tonsil sponge was removed from the nasopharynx and bleeding points were cauterized with suction cautery. Bilateral bleeding points of the tonsillar fossae were cauterized as well.  At the  conclusion, hemostasis was excellent, the wound was copiously irrigated, the gag was released for one minute and resuspended. There was no further bleeding. The contents of the stomach were evacuated with orogastric suction. The patient was taken out of suspension, the red rubber catheters were removed, and the patient was returned to the initial position, awakened, extubated and transported to the recovery room in good condition. The procedure was tolerated well and there were no apparent intraoperative complications.      Specimens:   Tonsils and adenoids sent for routine histopathology      Complications:   None      Findings:   Tonsils 2+  Adenoids: 2+   Negative

## 2025-06-13 ENCOUNTER — APPOINTMENT (OUTPATIENT)
Dept: OTOLARYNGOLOGY | Facility: CLINIC | Age: 30
End: 2025-06-13
Payer: COMMERCIAL

## 2025-06-13 VITALS — WEIGHT: 130.6 LBS | TEMPERATURE: 97.5 F | BODY MASS INDEX: 24.66 KG/M2 | HEIGHT: 61 IN

## 2025-06-13 DIAGNOSIS — J03.91 RECURRENT TONSILLITIS: Primary | ICD-10-CM

## 2025-06-13 PROCEDURE — 99024 POSTOP FOLLOW-UP VISIT: CPT | Performed by: OTOLARYNGOLOGY

## 2025-06-13 PROCEDURE — 1036F TOBACCO NON-USER: CPT | Performed by: OTOLARYNGOLOGY

## 2025-06-13 PROCEDURE — 3008F BODY MASS INDEX DOCD: CPT | Performed by: OTOLARYNGOLOGY

## 2025-06-13 NOTE — PROGRESS NOTES
29-year-old female around 4 weeks status post T&A.  Pathology benign.  Typical postoperative recovery.  The patient is doing well now.  Eating and drinking normally.  Breathing normally.  Speaking normally.      Exam:  Awake, alert, no apparent distress.  Oropharynx healing well.  Uvula normal.  Tonsillar pillars intact.  No cervical lymphadenopathy to palpation      We reviewed the pathology and wound care was reviewed.  Recheck as needed with new symptoms.

## 2025-08-28 ENCOUNTER — OFFICE VISIT (OUTPATIENT)
Dept: OTOLARYNGOLOGY | Facility: CLINIC | Age: 30
End: 2025-08-28
Payer: COMMERCIAL

## 2025-08-28 VITALS — HEIGHT: 60 IN | BODY MASS INDEX: 26.5 KG/M2 | WEIGHT: 135 LBS | TEMPERATURE: 96.9 F

## 2025-08-28 DIAGNOSIS — B99.9 INFECTION: ICD-10-CM

## 2025-08-28 DIAGNOSIS — R59.0 CERVICAL ADENOPATHY: Primary | ICD-10-CM

## 2025-08-28 PROCEDURE — 3008F BODY MASS INDEX DOCD: CPT | Performed by: OTOLARYNGOLOGY

## 2025-08-28 PROCEDURE — 99214 OFFICE O/P EST MOD 30 MIN: CPT | Performed by: OTOLARYNGOLOGY

## 2025-08-28 RX ORDER — AMOXICILLIN AND CLAVULANATE POTASSIUM 875; 125 MG/1; MG/1
875 TABLET, FILM COATED ORAL 2 TIMES DAILY
Qty: 20 TABLET | Refills: 0 | Status: SHIPPED | OUTPATIENT
Start: 2025-08-28 | End: 2025-09-07

## 2025-09-15 ENCOUNTER — APPOINTMENT (OUTPATIENT)
Dept: OTOLARYNGOLOGY | Facility: CLINIC | Age: 30
End: 2025-09-15
Payer: COMMERCIAL